# Patient Record
Sex: MALE | Race: WHITE | NOT HISPANIC OR LATINO | Employment: OTHER | URBAN - METROPOLITAN AREA
[De-identification: names, ages, dates, MRNs, and addresses within clinical notes are randomized per-mention and may not be internally consistent; named-entity substitution may affect disease eponyms.]

---

## 2017-05-23 PROBLEM — G89.29 CHRONIC PAIN OF LEFT KNEE: Status: ACTIVE | Noted: 2017-05-23

## 2017-05-23 PROBLEM — M25.562 CHRONIC PAIN OF LEFT KNEE: Status: ACTIVE | Noted: 2017-05-23

## 2017-05-26 ENCOUNTER — PATIENT OUTREACH (OUTPATIENT)
Dept: ADMINISTRATIVE | Facility: CLINIC | Age: 62
End: 2017-05-26

## 2017-05-26 ENCOUNTER — NURSE TRIAGE (OUTPATIENT)
Dept: ADMINISTRATIVE | Facility: CLINIC | Age: 62
End: 2017-05-26

## 2017-05-26 NOTE — PROGRESS NOTES
C3 nurse attempted to contact patient. No answer. The following message was left for the patient to return the call:  Good morning  I am a nurse calling on behalf of [facility] from the [facility].  This is a Transitional Care Call for Andrea King. When you have a moment please contact us at [phone] or [phone] Monday through Friday, between the hours of 8 am to 4 pm. We look forward to speaking with you. On behalf of [facility] have a nice day.    The patient has a scheduled [facility]OSFU appointment with [facility] on 06/12/17 @ 0815 hrs.

## 2017-05-26 NOTE — TELEPHONE ENCOUNTER
Reason for Disposition   [1] SEVERE back pain (e.g., excruciating) AND [2] sudden onset AND [3] age > 60    Protocols used: ST BACK PAIN-A-PATTI Mendez's wife is reporting Andrea has 100.5 temperature and has severe back pain not relieved by pain medication. He is post op day 3 from left knee arthroplasty. Wife states he has no issues with pain at surgery site or s/s of infection there. She states he does have a history of chronic back pain. His urine is darker than usual. His wife states he is drinking fluids and urinating regularly. Advised to bring him to [facility] ED for evaluation. She agrees to plan. Please contact caller with any further care advice.

## 2017-05-26 NOTE — PROGRESS NOTES
Patient's wife reports unable to do PT in [facility], called outpatient PT @ [facility] spoke with Kaitlin, she informed me that [facility] will contact patient by middle of next week with schedule. Notified Mrs King reported this, verbalizes understanding.

## 2017-05-26 NOTE — PATIENT INSTRUCTIONS
Discharge Instructions for Total Knee Replacement  You have undergone knee replacement surgery. The knee joint forms where the thighbone, shinbone, and kneecap meet. The knee joint is supported by muscles and ligaments, and is lined with a cushioning called cartilage. Over time, cartilage wears away. This can make the knee feel stiff and painful. Your doctor replaced your painful joint with an artificial joint to relieve pain and restore movement. Here are some instructions to follow once at home.  Home care  · When your doctor says it's OK to shower, carefully wash your incision with soap and water. Rinse the incision well. Then gently pat it dry. Dont rub the incision, or apply creams or lotions. Sit on a shower stool or chair when you shower to keep from falling.  · Take pain medicine as directed by your doctor.  Sitting and sleeping  · Sit in chairs with arms. The arms make it easier for you to stand up or sit down.  · Dont sit for more than 30 to 45 minutes at one time.  · Nap if you are tired, but dont stay in bed all day.  · Sleep with a pillow under your ankle, not your knee. Be sure to change the position of your leg during the night.  Moving safely  · The key to successful recovery is movement with walking and exercising your knee as directed by your doctor. You should be able to start moving your leg shortly after surgery as directed by your doctor.    · Walk up and down stairs with support. Try one step at a time. Use the railing if possible.  · Dont drive until your doctor says its OK. Most people can start driving about 6 weeks after surgery. Dont drive while you are taking opioid pain medication.  Other precautions  · Avoid soaking your knee in water (no hot tubs, bathtubs, swimming pools) until your doctor says its OK.  · Wear the support stockings you were given in the hospital, as instructed by your doctor. You may wear these stockings for 4 to 6 weeks after surgery. If needed, you can place  a bandage over the incision to prevent irritation from clothing or support stockings.  · Arrange your household to keep the items you need handy. Keep everything else out of the way. Remove items that may cause you to fall, such as throw rugs and electrical cords.  · Use nonslip bath mats, grab bars, an elevated toilet seat, and a shower chair in your bathroom.  · Until your balance, flexibility, and strength improve, use a cane, crutches, a walker, handrails, or someone to help you.  · Keep your hands free by using a backpack, miroslava pack, apron, or pockets to carry things.  · Prevent infection. Ask your doctor for instructions if you havent already received them. Any infection will need to be treated immediately. Call your doctor right away if you think you might have an infection.  · Tell your dentist that you have an artificial joint and take antibiotics as prescribed before any dental work.  · Tell all your healthcare providers about your artificial joint before any medical procedure.  · Maintain a healthy weight. Get help to lose any extra pounds. Added body weight puts stress on the knee.  · Take any medicine you may have been given after surgery. This may include blood-thinning medicine to prevent blood clots or antibiotics to prevent infection.  Follow-up care  Follow up with your healthcare provider, or as advised. You will need to have your staples removed 2 to 3 weeks after surgery.     When to call your healthcare provider  Call 911 right away if you have:  · Chest pain  · Shortness of breath  · Any pain or tenderness in your calf  Otherwise, call your healthcare provider right away if you have:  · Fever of 100.4 °F (38 °C) or higher, or as advised  · Shaking chills  · Stiffness, or inability to move the knee  · Increased swelling in your leg  · Increased redness, tenderness, or swelling in or around the knee incision  · Drainage from the knee incision  · Increased knee pain   Date Last Reviewed:  7/1/2016  © 0661-0225 [employer]. [address] [place] [address], [place] [address]. All rights reserved. This information is not intended as a substitute for professional medical care. Always follow your healthcare professional's instructions.

## 2017-05-29 ENCOUNTER — NURSE TRIAGE (OUTPATIENT)
Dept: ADMINISTRATIVE | Facility: CLINIC | Age: 62
End: 2017-05-29

## 2017-05-29 NOTE — TELEPHONE ENCOUNTER
Spouse called re L TKR 5/23. C/o pain in left calf - good color, good color, feet warm. No SOB, left calf more swollen than right. Having trouble putting foot all the way down. Pt has had knee surg. rec ED due to sx of DVT.Call back with questions.  rec 911 if SOB, sx worsen.     Reason for Disposition   [1] Can't walk or can barely walk AND [2] new onset    Protocols used: ST LEG SWELLING AND EDEMA-A-AH

## 2017-07-07 PROBLEM — G89.29 CHRONIC BILATERAL LOW BACK PAIN WITH BILATERAL SCIATICA: Status: ACTIVE | Noted: 2017-07-07

## 2017-07-07 PROBLEM — M54.16 LUMBAR RADICULAR PAIN: Status: ACTIVE | Noted: 2017-07-07

## 2017-07-07 PROBLEM — M54.42 CHRONIC BILATERAL LOW BACK PAIN WITH BILATERAL SCIATICA: Status: ACTIVE | Noted: 2017-07-07

## 2017-07-07 PROBLEM — M54.41 CHRONIC BILATERAL LOW BACK PAIN WITH BILATERAL SCIATICA: Status: ACTIVE | Noted: 2017-07-07

## 2017-10-25 PROBLEM — M25.512 ACUTE PAIN OF LEFT SHOULDER: Status: ACTIVE | Noted: 2017-10-25

## 2017-11-10 PROBLEM — K74.60 CIRRHOSIS: Status: ACTIVE | Noted: 2017-11-10

## 2017-11-27 ENCOUNTER — LAB VISIT (OUTPATIENT)
Dept: LAB | Facility: HOSPITAL | Age: 62
End: 2017-11-27
Payer: MEDICAID

## 2017-11-27 DIAGNOSIS — Z96.652 STATUS POST LEFT KNEE REPLACEMENT: ICD-10-CM

## 2017-11-27 PROCEDURE — 86353 LYMPHOCYTE TRANSFORMATION: CPT

## 2017-11-27 PROCEDURE — 36415 COLL VENOUS BLD VENIPUNCTURE: CPT

## 2017-12-07 LAB — METAL SENSITIVITY TESTING, METALS ONLY: NORMAL

## 2019-01-16 NOTE — H&P (VIEW-ONLY)
HPI:  Andrea King is a 64 y.o. male sp left TKA.   Main issue in pain in lateral knee - severe and worsening.  No deep knee pain.  No fevers or chills or drainage.  No prior history of infection . IT band injection helped some. Voltaren gel has worked well in the past.    05/23/2017  PROCEDURE:  Left total knee replacement. (CPT# 52818)   SURGEON: Magdi Solis M.D.   ASSISTANT:   Rowdy Cabello MD and Rudi Galaviz.      Interval: continued lateral pain, has not changed since last visit.  Injections and stretches have not helped.      PE:  Incision is well healed with no signs of infection.  Tender along lateral capsule and fibular head AROM 5-130        xrays today show TKA in excellent position.  No fractures or loosening.    Sed Rate   Date Value Ref Range Status   10/25/2017 2 0 - 10 mm/Hr Final     CRP   Date Value Ref Range Status   10/25/2017 <0.5 <0.9 mg/L Final         A/P:  Andrea King is a 64 y.o. male sp above procedure.  Continued pain for 2 years.  Lateral.  Will send to [facility] for geniculate block/ablation.  RTC 3 months

## 2019-02-06 ENCOUNTER — HOSPITAL ENCOUNTER (OUTPATIENT)
Facility: OTHER | Age: 64
Discharge: HOME | End: 2019-02-06
Attending: ANESTHESIOLOGY | Admitting: ANESTHESIOLOGY
Payer: MEDICARE

## 2019-02-06 VITALS
HEART RATE: 71 BPM | OXYGEN SATURATION: 97 % | RESPIRATION RATE: 18 BRPM | BODY MASS INDEX: 24.78 KG/M2 | WEIGHT: 187 LBS | HEIGHT: 73 IN | SYSTOLIC BLOOD PRESSURE: 126 MMHG | TEMPERATURE: 98 F | DIASTOLIC BLOOD PRESSURE: 62 MMHG

## 2019-02-06 DIAGNOSIS — G89.29 CHRONIC KNEE PAIN: ICD-10-CM

## 2019-02-06 DIAGNOSIS — G89.29 CHRONIC KNEE PAIN, UNSPECIFIED LATERALITY: Primary | ICD-10-CM

## 2019-02-06 DIAGNOSIS — M25.569 CHRONIC KNEE PAIN, UNSPECIFIED LATERALITY: Primary | ICD-10-CM

## 2019-02-06 DIAGNOSIS — M17.9 OSTEOARTHRITIS OF KNEE, UNSPECIFIED LATERALITY, UNSPECIFIED OSTEOARTHRITIS TYPE: ICD-10-CM

## 2019-02-06 DIAGNOSIS — M25.569 CHRONIC KNEE PAIN: ICD-10-CM

## 2019-02-06 LAB — POCT GLUCOSE: 80 MG/DL (ref 70–110)

## 2019-02-06 PROCEDURE — 82947 ASSAY GLUCOSE BLOOD QUANT: CPT | Performed by: ANESTHESIOLOGY

## 2019-02-06 PROCEDURE — 64450 NJX AA&/STRD OTHER PN/BRANCH: CPT | Mod: LT,,, | Performed by: ANESTHESIOLOGY

## 2019-02-06 PROCEDURE — 63600175 PHARM REV CODE 636 W HCPCS: Performed by: ANESTHESIOLOGY

## 2019-02-06 PROCEDURE — 77002 NEEDLE LOCALIZATION BY XRAY: CPT | Performed by: ANESTHESIOLOGY

## 2019-02-06 PROCEDURE — 25000003 PHARM REV CODE 250: Performed by: PHYSICAL MEDICINE & REHABILITATION

## 2019-02-06 PROCEDURE — 64450 PR NERVE BLOCK INJ, ANES/STEROID, OTHER PERIPHERAL: ICD-10-PCS | Mod: LT,,, | Performed by: ANESTHESIOLOGY

## 2019-02-06 PROCEDURE — 25000003 PHARM REV CODE 250: Performed by: ANESTHESIOLOGY

## 2019-02-06 PROCEDURE — 64450 NJX AA&/STRD OTHER PN/BRANCH: CPT | Performed by: ANESTHESIOLOGY

## 2019-02-06 RX ORDER — BUPIVACAINE HYDROCHLORIDE 2.5 MG/ML
INJECTION, SOLUTION EPIDURAL; INFILTRATION; INTRACAUDAL
Status: DISCONTINUED | OUTPATIENT
Start: 2019-02-06 | End: 2019-02-06 | Stop reason: HOSPADM

## 2019-02-06 RX ORDER — SODIUM CHLORIDE 9 MG/ML
500 INJECTION, SOLUTION INTRAVENOUS CONTINUOUS
Status: DISCONTINUED | OUTPATIENT
Start: 2019-02-06 | End: 2019-02-06 | Stop reason: HOSPADM

## 2019-02-06 RX ORDER — LIDOCAINE HYDROCHLORIDE 10 MG/ML
INJECTION INFILTRATION; PERINEURAL
Status: DISCONTINUED | OUTPATIENT
Start: 2019-02-06 | End: 2019-02-06 | Stop reason: HOSPADM

## 2019-02-06 RX ORDER — TRIAMCINOLONE ACETONIDE 40 MG/ML
INJECTION, SUSPENSION INTRA-ARTICULAR; INTRAMUSCULAR
Status: DISCONTINUED | OUTPATIENT
Start: 2019-02-06 | End: 2019-02-06 | Stop reason: HOSPADM

## 2019-02-06 RX ORDER — MIDAZOLAM HYDROCHLORIDE 1 MG/ML
INJECTION, SOLUTION INTRAMUSCULAR; INTRAVENOUS
Status: DISCONTINUED | OUTPATIENT
Start: 2019-02-06 | End: 2019-02-06 | Stop reason: HOSPADM

## 2019-02-06 RX ADMIN — SODIUM CHLORIDE 500 ML: 0.9 INJECTION, SOLUTION INTRAVENOUS at 10:02

## 2019-02-06 NOTE — OP NOTE
Patient Name: Andrea King  MRN: [id number]    INFORMED CONSENT: The procedure, risks, benefits and options were discussed with patient. There are no contraindications to the procedure. The patient expressed understanding and agreed to proceed. The personnel performing the procedure was discussed. I verify that I personally obtained Andrea's consent prior to the start of the procedure and the signed consent can be found on the patient's chart.    Procedure Date: 02/06/2019    Anesthesia: Topical    Pre Procedure diagnosis: Chronic pain of left knee [M25.562, G89.29]  1. Chronic knee pain, unspecified laterality    2. Osteoarthritis of knee, unspecified laterality, unspecified osteoarthritis type    3. Chronic knee pain      Post-Procedure diagnosis: SAME      Moderate Sedation: No      PROCEDURE: LEFT GENICULAR NERVE BLOCK    DESCRIPTION OF PROCEDURE: The patient was brought to the fluoroscopy suite. IV access was obtained prior to the procedure. The patient was positioned supine on the fluoroscopy table. Continuous hemodynamic monitoring was initiated including blood pressure, EKG, and pulse oximetry. The area of the around the left knee joint was prepped with chlorhexidine  and draped into a sterile field. Skin anesthesia was achieved using Lidocaine 1% over the respective injection sites. A 22 gauge 3.5 inch spinal needle was slowly inserted and advanced to the junction of the lateral femoral and the epicondyle while contacting periosteum to block the superior lateral genicular nerve using the AP and lateral fluoroscopic imaging. Same process was repeated at the junction of the medial femoral shaft and the epicondyle to block the superior medial genicular nerve. The same process was repeated again using a 3rd needle which was advanced at the junction of the medial tibial plateau and the epicondyle to block the inferior medial genicular nerve. Using AP fluoroscopy the position of all 3 needles was confirmed.  Afterwards lateral fluoroscopic images were obtained and the needles were adjusted to lay in the middle of the diaphysis.Negative aspiration for blood. . A combination of 3 cc of Bupivacaine 0.25% and 40 mg of kenalog was injected at all needle location to block all targeted nerves.. There was no pain on injection. The needle was removed and bleeding was nil. A sterile dressing was applied. No specimens collected. PATIENT was taken to the Post-block Recovery Area for further observation.       Blood Loss: Nill  Specimen: None        Rowdy Ivy MD

## 2019-02-06 NOTE — DISCHARGE INSTRUCTIONS
Adult Procedural Sedation Instructions    Recovery After Procedural Sedation (Adult)  You have been given medicine by vein to make you sleep during your surgery. This may have included both a pain medicine and sleeping medicine. Most of the effects have worn off. But you may still have some drowsiness for the next 6 to 8 hours.  Home care  Follow these guidelines when you get home:  · For the next 8 hours, you should be watched by a responsible adult. This person should make sure your condition is not getting worse.  · Don't drink any alcohol for the next 24 hours.  · Don't drive, operate dangerous machinery, or make important business or personal decisions during the next 24 hours.  Note: Your healthcare provider may tell you not to take any medicine by mouth for pain or sleep in the next 4 hours. These medicines may react with the medicines you were given in the hospital. This could cause a much stronger response than usual.  Follow-up care  Follow up with your healthcare provider if you are not alert and back to your usual level of activity within 12 hours.  When to seek medical advice  Call your healthcare provider right away if any of these occur:  · Drowsiness gets worse  · Weakness or dizziness gets worse  · Repeated vomiting  · You can't be awakened   Date Last Reviewed: 10/18/2016  © 4971-6745 [employer]. [address] [place] [address], [place] [address]. All rights reserved. This information is not intended as a substitute for professional medical care. Always follow your healthcare professional's instructions.       Thank you for allowing us to care for you today. You may receive a survey about the care we provided. Your feedback is valuable and helps us provide excellent care throughout the community.     Home Care Instructions for Pain Management:    1. DIET:   You may resume your normal diet today.   2. BATHING:   You may shower with luke warm water. No tub baths or anything that will soak  injection sites under water for the next 24 hours.  3. DRESSING:   You may remove your bandage today.   4. ACTIVITY LEVEL:   You may resume your normal activities 24 hrs after your procedure. Nothing strenuous today.  5. MEDICATIONS:   You may resume your normal medications today. To restart blood thinners, ask your doctor.  6. DRIVING    If you have received any sedatives by mouth today, you may not drive for 12 hours.    If you have received any sedation through your IV, you may not drive for 24 hrs.   7. SPECIAL INSTRUCTIONS:   No heat to the injection site for 24 hrs including, hot bath or shower, heating pad, moist heat, or hot tubs.    Use ice pack to injection site for any pain or discomfort.  Apply ice packs for 20 minute intervals as needed.    IF you have diabetes, be sure to monitor your blood sugar more closely. IF your injection contained steroids your blood sugar levels may become higher than normal.    If you are still having pain upon discharge:  Your pain may improve over the next 48 hours. The anesthetic (numbing medication) works immediately to 48 hours. IF your injection contained a steroid (anti-inflammatory medication), it takes approximately 3 days to start feeling relief and 7-10 days to see your greatest results from the medication. It is possible you may need subsequent injections. This would be discussed at your follow up appointment with pain management or your referring doctor.      PLEASE CALL YOUR DOCTOR IF:  1. Redness or swelling around the injection site.  2. Fever of 101 degrees or more  3. Drainage (pus) from the injection site.  4. For any continuous bleeding (some dried blood over the incision is normal.)    FOR EMERGENCIES:   If any unusual problems or difficulties occur during clinic hours, call [phone] or [phone].

## 2019-02-06 NOTE — DISCHARGE SUMMARY
Discharge Note  Short Stay      SUMMARY     Admit Date: 2/6/2019    Attending Physician: Rowdy Ivy      Discharge Physician: Rowdy Ivy      Discharge Date: 2/6/2019 10:51 AM    Procedure(s) (LRB):  BLOCK, NERVE, GENICULAR (Left)    Final Diagnosis: Chronic pain of left knee [M25.562, G89.29]    Disposition: Home or self care    Patient Instructions:   Current Discharge Medication List      CONTINUE these medications which have NOT CHANGED    Details   B-complex with vitamin C (Z-BEC OR EQUIV) tablet Take 1 tablet by mouth once daily.      blood sugar diagnostic Strp 1 strip by Misc.(Non-Drug; Combo Route) route once daily.  Qty: 50 each, Refills: 11      capsaicin 0.1 % Crea Apply 3 mLs topically 3 (three) times daily as needed.  Qty: 56.6 g, Refills: 0      citalopram (CELEXA) 20 MG tablet TAKE ONE TABLET BY MOUTH ONCE DAILY  Qty: 30 tablet, Refills: 11    Comments: Please consider 90 day supplies to promote better adherence      clonazepam (KLONOPIN) 1 MG tablet Take 1 mg by mouth 2 (two) times daily as needed.        diclofenac sodium (VOLTAREN) 1 % Gel Apply 2 g topically once daily.  Qty: 1 Tube, Refills: 2      docusate sodium (COLACE) 100 MG capsule Take 1 capsule (100 mg total) by mouth 2 (two) times daily as needed.  Qty: 60 capsule, Refills: 0      gabapentin (NEURONTIN) 800 MG tablet Take 1 tablet (800 mg total) by mouth 3 (three) times daily.  Qty: 90 tablet, Refills: 11      lancets Misc 1 each by Misc.(Non-Drug; Combo Route) route once daily.  Qty: 50 each, Refills: 11      lisinopril (PRINIVIL,ZESTRIL) 20 MG tablet Take 1 tablet (20 mg total) by mouth once daily.  Qty: 30 tablet, Refills: 11    Comments: Please consider 90 day supplies to promote better adherence      metFORMIN (GLUCOPHAGE) 1000 MG tablet Take 1 tablet (1,000 mg total) by mouth 2 (two) times daily.  Qty: 60 tablet, Refills: 11    Comments: Please consider 90 day supplies to promote better adherence      milk thistle 175  mg tablet Take 1,000 mg by mouth once daily.      ondansetron (ZOFRAN) 4 MG tablet Take 2 tablets (8 mg total) by mouth every 8 (eight) hours as needed.  Qty: 20 tablet, Refills: 0      oxyCODONE-acetaminophen (PERCOCET) 5-325 mg per tablet Take 1 tablet by mouth every 8 (eight) hours as needed for Pain.  Qty: 60 tablet, Refills: 0      traZODone (DESYREL) 100 MG tablet TAKE ONE TABLET BY MOUTH ONCE DAILY IN THE EVENING  Qty: 30 tablet, Refills: 11    Comments: Please consider 90 day supplies to promote better adherence                 Discharge Diagnosis: Chronic pain of left knee [M25.562, G89.29]  Condition on Discharge: Stable with no complications to procedure   Diet on Discharge: Same as before.  Activity: as per instruction sheet.  Discharge to: Home with a responsible adult.  Follow up: 2-4 weeks

## 2019-02-06 NOTE — INTERVAL H&P NOTE
The patient has been examined and the H&P has been reviewed:    I concur with the findings and no changes have occurred since H&P was written. Patient continues with the same type and quality of pain involving the left knee. Denies any use of blood thinning agents or concern for infection.     Anesthesia/Surgery risks, benefits and alternative options discussed and understood by patient/family.          Active Hospital Problems    Diagnosis  POA    Chronic knee pain [M25.569, G89.29]  Yes      Resolved Hospital Problems   No resolved problems to display.     Plan:  Proceed with left sided genicular nerve blocks today.    Discussed and evaluated patient with staff. Thank you for allowing me to participate in their care.     Rowdy Ivy  Pain Medicine Fellow  [facility]  PGYV

## 2019-02-07 ENCOUNTER — TELEPHONE (OUTPATIENT)
Dept: PAIN MEDICINE | Facility: CLINIC | Age: 64
End: 2019-02-07

## 2019-02-07 NOTE — TELEPHONE ENCOUNTER
----- Message from Tash Harmon sent at 2/7/2019 12:41 PM CST -----  Contact: Pt's wife  Name of Who is Calling: Sunshine      What is the request in detail: Pain Diary, after procedure 11:15 am Pain 4, 12noon 3, 1pm 2, 2pm 2, 3pm 2, 4pm 2, 5pm, 1. 10pm 1, and today at 11am pain at 2. Overall percentage 90-95%. Please call and advise      Can the clinic reply by [facility]: no      What Number to Call Back if not in [facility]: [phone]

## 2019-02-07 NOTE — TELEPHONE ENCOUNTER
Contacted and spoke with pt's wife regarding pain diary.Valeriemaryan reports % of overall pain relief from injection on 2/6/19

## 2019-02-18 ENCOUNTER — TELEPHONE (OUTPATIENT)
Dept: PAIN MEDICINE | Facility: CLINIC | Age: 64
End: 2019-02-18

## 2019-02-18 NOTE — TELEPHONE ENCOUNTER
Patient was a direct referral for procedure. Pain diary results presented to that provider. No response captured at this time, please check with their office.

## 2019-02-18 NOTE — TELEPHONE ENCOUNTER
----- Message from Yumiko Birch sent at 2/18/2019  8:19 AM CST -----  Will it be ok to schedule pt. For Left Knee Rfa with Dr. Sanders, please advise.

## 2019-06-06 ENCOUNTER — PATIENT OUTREACH (OUTPATIENT)
Dept: ADMINISTRATIVE | Facility: HOSPITAL | Age: 64
End: 2019-06-06

## 2019-06-13 ENCOUNTER — TELEPHONE (OUTPATIENT)
Dept: PAIN MEDICINE | Facility: CLINIC | Age: 64
End: 2019-06-13

## 2019-06-13 NOTE — TELEPHONE ENCOUNTER
----- Message from Summer Fritz sent at 6/13/2019 10:09 AM CDT -----  Contact: pt  Name of Who is Calling: CLEO MONTALVO [[id number]]    What is the request in detail: pt calling in regards to being in a lot of pain.. Pt would like to get nerves burned.. Please advise    Can the clinic reply by [facility]: no      What Number to [facility] Back [facility] not [facility]: [phone]

## 2019-06-13 NOTE — TELEPHONE ENCOUNTER
Contacted and spoke with pt's wife regarding RFA. Pt was offered and scheduled for consultation with Dr. Taylor to discuss RFA.    Pt's wife verbalized understanding

## 2019-06-20 ENCOUNTER — TELEPHONE (OUTPATIENT)
Dept: PAIN MEDICINE | Facility: CLINIC | Age: 64
End: 2019-06-20

## 2019-06-20 NOTE — TELEPHONE ENCOUNTER
Called to confirm appointment with Dr. Taylor on 6/27/19 @ 1:30pm  in pain management clinic.          Pt wife confirmed. Informed pt of IMP. Verified pt insurance. Informed pt of location and suite number.  Patient gave verbal understanding

## 2019-06-24 PROBLEM — M43.00 SPONDYLOLYSIS: Status: ACTIVE | Noted: 2019-06-24

## 2019-06-24 PROBLEM — M48.02 SPINAL STENOSIS OF CERVICAL REGION: Status: ACTIVE | Noted: 2019-06-24

## 2019-06-27 ENCOUNTER — OFFICE VISIT (OUTPATIENT)
Dept: PAIN MEDICINE | Facility: CLINIC | Age: 64
End: 2019-06-27
Attending: ANESTHESIOLOGY
Payer: MEDICARE

## 2019-06-27 VITALS
SYSTOLIC BLOOD PRESSURE: 126 MMHG | DIASTOLIC BLOOD PRESSURE: 80 MMHG | HEIGHT: 73 IN | RESPIRATION RATE: 18 BRPM | BODY MASS INDEX: 23.86 KG/M2 | HEART RATE: 61 BPM | WEIGHT: 180 LBS | TEMPERATURE: 99 F

## 2019-06-27 DIAGNOSIS — M17.12 PRIMARY OSTEOARTHRITIS OF LEFT KNEE: Primary | Chronic | ICD-10-CM

## 2019-06-27 DIAGNOSIS — G89.29 CHRONIC PAIN OF LEFT KNEE: ICD-10-CM

## 2019-06-27 DIAGNOSIS — M25.562 CHRONIC PAIN OF LEFT KNEE: ICD-10-CM

## 2019-06-27 DIAGNOSIS — M47.819 SPONDYLOSIS WITHOUT MYELOPATHY: ICD-10-CM

## 2019-06-27 DIAGNOSIS — M47.9 OSTEOARTHRITIS OF SPINE, UNSPECIFIED SPINAL OSTEOARTHRITIS COMPLICATION STATUS, UNSPECIFIED SPINAL REGION: ICD-10-CM

## 2019-06-27 PROCEDURE — 3074F SYST BP LT 130 MM HG: CPT | Mod: CPTII,S$GLB,, | Performed by: ANESTHESIOLOGY

## 2019-06-27 PROCEDURE — 99214 PR OFFICE/OUTPT VISIT, EST, LEVL IV, 30-39 MIN: ICD-10-PCS | Mod: S$GLB,,, | Performed by: ANESTHESIOLOGY

## 2019-06-27 PROCEDURE — 99214 OFFICE O/P EST MOD 30 MIN: CPT | Mod: S$GLB,,, | Performed by: ANESTHESIOLOGY

## 2019-06-27 PROCEDURE — 99999 PR PBB SHADOW E&M-EST. PATIENT-LVL III: CPT | Mod: PBBFAC,,, | Performed by: ANESTHESIOLOGY

## 2019-06-27 PROCEDURE — 1101F PR PT FALLS ASSESS DOC 0-1 FALLS W/OUT INJ PAST YR: ICD-10-PCS | Mod: CPTII,S$GLB,, | Performed by: ANESTHESIOLOGY

## 2019-06-27 PROCEDURE — 99999 PR PBB SHADOW E&M-EST. PATIENT-LVL III: ICD-10-PCS | Mod: PBBFAC,,, | Performed by: ANESTHESIOLOGY

## 2019-06-27 PROCEDURE — 1101F PT FALLS ASSESS-DOCD LE1/YR: CPT | Mod: CPTII,S$GLB,, | Performed by: ANESTHESIOLOGY

## 2019-06-27 PROCEDURE — 3074F PR MOST RECENT SYSTOLIC BLOOD PRESSURE < 130 MM HG: ICD-10-PCS | Mod: CPTII,S$GLB,, | Performed by: ANESTHESIOLOGY

## 2019-06-27 PROCEDURE — 3079F DIAST BP 80-89 MM HG: CPT | Mod: CPTII,S$GLB,, | Performed by: ANESTHESIOLOGY

## 2019-06-27 PROCEDURE — 3079F PR MOST RECENT DIASTOLIC BLOOD PRESSURE 80-89 MM HG: ICD-10-PCS | Mod: CPTII,S$GLB,, | Performed by: ANESTHESIOLOGY

## 2019-06-27 RX ORDER — LANOLIN ALCOHOL/MO/W.PET/CERES
100 CREAM (GRAM) TOPICAL DAILY
COMMUNITY

## 2019-06-27 RX ORDER — AMOXICILLIN 500 MG
CAPSULE ORAL DAILY
COMMUNITY

## 2019-06-27 RX ORDER — LANOLIN ALCOHOL/MO/W.PET/CERES
1 CREAM (GRAM) TOPICAL 2 TIMES DAILY
COMMUNITY

## 2019-06-27 NOTE — PROGRESS NOTES
Chronic Pain - New Consult    Referring Physician: Jay Jay Valdez MD    Chief Complaint:   Chief Complaint   Patient presents with    Knee Pain     left         SUBJECTIVE:    Andrea King presents to the clinic for the evaluation of left knee pain. The pain started 1-2 years ago following knee replacement and symptoms have been worsening.The pain is located in the left knee area and radiates to the calf.  The pain is described as constant and is rated as 10/10. The pain is rated with a score of  8/10 on the BEST day and a score of 10/10 on the WORST day.  Symptoms interfere with daily activity, sleeping and work. The pain is exacerbated by Bending, Night Time, Extension and Lifting.  The pain is mitigated by nothing. He reports spending 30 minutes per day reclining. The patient reports 3-4 hours of uninterrupted sleep per night.  Patient was referred to us for a left knee genicular nerve block for which he stated had 100% relief for the 1st 3 days and overall 80% relief for 1 week  He is here today for an evaluation regarding proceeding with his genicular nerve radiofrequency ablation    Patient denies night fever/night sweats, urinary incontinence, bowel incontinence, significant weight loss, significant motor weakness and loss of sensations.    Physical Therapy/Home Exercise: yes, home exercise     Pain Disability Index Review:  Last 3 PDI Scores 2019   Pain Disability Index (PDI) 35       Pain Medications:    - Opioids: Ultram ER ( Tramadol HCL)  - Adjuvant Medications: none  - Anti-Coagulants: none  - Others: see med list     report:  Reviewed and consistent with medication use as prescribed.    Pain Procedures:     19-LEFT GENICULAR NERVE BLOCK    Imagin/15/19   Narrative     EXAMINATION:  XR KNEE ORTHO LEFT    CLINICAL HISTORY:  Pain in left knee    COMPARISON:  Left knee including AP view both knees 2018.    FINDINGS:  Left knee--total left knee arthroplasty again noted with  no change in positioning of prosthesis or alignment.  No superimposed acute bony abnormality is seen.    Single AP view right knee--moderate medial compartment joint space narrowing with mild subchondral sclerosis and medial marginal spurring unchanged.      Impression       Stable appearance total left knee arthroplasty.    Degenerative change right knee as previously seen.      Electronically signed by: Sagrario Oneill MD  Date: 01/16/2019  Time: 09:08         Past Medical History:   Diagnosis Date    Arthritis     Back pain     Bipolar disorder     Cirrhosis     Cirrhosis of liver 1/9/2015    Diabetes     FH: total knee replacement 05/2017    Hypertension     Neck pain     Sciatica     Sinus congestion      Past Surgical History:   Procedure Laterality Date    APPENDECTOMY      ARTHROPLASTY-KNEE Left 5/23/2017    [facility] at [facility] OR    BIOPSY N/A 9/25/2014    Performed by Gregory Powers MD at [facility] CATH LAB    BLOCK, NERVE, GENICULAR Left 2/6/2019    [facility] PAIN MGT    COLONOSCOPY  2014    marginal prep, repeat 2019    COLONOSCOPY  2011    TA polyps    COLONOSCOPY N/A 11/13/2014    Performed by Ginna Barnes MD at [facility] ENDO    ESOPHAGOGASTRODUODENOSCOPY (EGD) N/A 11/10/2017    Performed by Madi Bernard MD at [facility] ENDO    FINGER TENDON REPAIR Left     trigger finger    HERNIA REPAIR      KNEE SURGERY Left     LIVER BIOPSY  2014    steatosis cirrhosis    TONSILLECTOMY, ADENOIDECTOMY      TOTAL KNEE ARTHROPLASTY  05/2017    UPPER GASTROINTESTINAL ENDOSCOPY  11/2017    normal-rpt 2yrs     Social History     Socioeconomic History    Marital status:      Spouse name: Not on file    Number of children: Not on file    Years of education: 8    Highest education level: Not on file   Occupational History    Not on file   Social Needs    Financial resource strain: Not on file    Food insecurity:     Worry: Not on  file     Inability: Not on file    Transportation needs:     Medical: Not on file     Non-medical: Not on file   Tobacco Use    Smoking status: Former Smoker     Packs/day: 2.00     Years: 3.00     Pack years: 6.00     Types: Cigarettes    Smokeless tobacco: Former User   Substance and Sexual Activity    Alcohol use: No     Alcohol/week: 0.0 oz    Drug use: No    Sexual activity: Yes   Lifestyle    Physical activity:     Days per week: Not on file     Minutes per session: Not on file    Stress: Not on file   Relationships    Social connections:     Talks on phone: Not on file     Gets together: Not on file     Attends [employer] service: Not on file     Active member of club or organization: Not on file     Attends meetings of clubs or organizations: Not on file     Relationship status: Not on file   Other Topics Concern    Not on file   Social History Narrative    Not on file     Family History   Problem Relation Age of Onset    Atrial fibrillation Mother     Diabetes Mother     Coronary artery disease Father     Leukemia Sister     Aneurysm Brother     No Known Problems Maternal Aunt     No Known Problems Maternal Uncle     No Known Problems Paternal Aunt     No Known Problems Paternal Uncle     No Known Problems Maternal Grandmother     No Known Problems Maternal Grandfather     No Known Problems Paternal Grandmother     No Known Problems Paternal Grandfather     Celiac disease Neg Hx     Cirrhosis Neg Hx     Colon cancer Neg Hx     Colon polyps Neg Hx     Crohn's disease Neg Hx     Cystic fibrosis Neg Hx     Esophageal cancer Neg Hx     Hemochromatosis Neg Hx     Inflammatory bowel disease Neg Hx     Irritable bowel syndrome Neg Hx     Liver cancer Neg Hx     Liver disease Neg Hx     Rectal cancer Neg Hx     Stomach cancer Neg Hx     Ulcerative colitis Neg Hx     Bakari's disease Neg Hx        Review of patient's allergies indicates:   Allergen Reactions    Vicodin  [hydrocodone-acetaminophen] Anxiety     Make him angry       Current Outpatient Medications   Medication Sig    B-complex with vitamin C (Z-BEC OR EQUIV) tablet Take 1 tablet by mouth once daily.    blood sugar diagnostic Strp 1 strip by Misc.(Non-Drug; Combo Route) route once daily.    calcium citrate-vitamin D3 315-200 mg (CITRACAL+D) 315-200 mg-unit per tablet Take 1 tablet by mouth 2 (two) times daily.    citalopram (CELEXA) 20 MG tablet TAKE ONE TABLET BY MOUTH ONCE DAILY    cyanocobalamin (VITAMIN B-12) 1000 MCG tablet Take 100 mcg by mouth once daily.    fish oil-omega-3 fatty acids 300-1,000 mg capsule Take by mouth once daily.    lancets Misc 1 each by Misc.(Non-Drug; Combo Route) route once daily.    lisinopril (PRINIVIL,ZESTRIL) 20 MG tablet Take 1 tablet (20 mg total) by mouth once daily.    metFORMIN (GLUCOPHAGE) 1000 MG tablet Take 1 tablet (1,000 mg total) by mouth 2 (two) times daily.    milk thistle 175 mg tablet Take 1,000 mg by mouth once daily.    traMADol (ULTRAM) 50 mg tablet Take 1 tablet (50 mg total) by mouth every 6 (six) hours as needed (for breakthrough pain).    traZODone (DESYREL) 100 MG tablet TAKE ONE TABLET BY MOUTH ONCE DAILY IN THE EVENING    capsaicin 0.1 % Crea Apply 3 mLs topically 3 (three) times daily as needed.    clonazepam (KLONOPIN) 1 MG tablet Take 1 mg by mouth 2 (two) times daily as needed.      diclofenac sodium (VOLTAREN) 1 % Gel Apply 2 g topically once daily.    docusate sodium (COLACE) 100 MG capsule Take 1 capsule (100 mg total) by mouth 2 (two) times daily as needed.    gabapentin (NEURONTIN) 800 MG tablet Take 1 tablet (800 mg total) by mouth 3 (three) times daily.    ondansetron (ZOFRAN) 4 MG tablet Take 2 tablets (8 mg total) by mouth every 8 (eight) hours as needed.    oxyCODONE-acetaminophen (PERCOCET) 5-325 mg per tablet Take 1 tablet by mouth every 8 (eight) hours as needed for Pain.     No current facility-administered medications for  "this visit.        REVIEW OF SYSTEMS:    GENERAL:  No weight loss, malaise or fevers.  HEENT:  Negative for frequent or significant headaches.  NECK:  Negative for lumps, goiter, pain and significant neck swelling.  RESPIRATORY:  Negative for cough, wheezing or shortness of breath.  CARDIOVASCULAR:  Negative for chest pain, leg swelling or palpitations.  GI:  Negative for abdominal discomfort, blood in stools or black stools or change in bowel habits.  MUSCULOSKELETAL:  See HPI.  SKIN:  Negative for lesions, rash, and itching.  PSYCH:  +ve  for sleep disturbance, mood disorder and recent psychosocial stressors.  HEMATOLOGY/LYMPHOLOGY:  Negative for prolonged bleeding, bruising easily or swollen nodes.  NEURO:   No history of headaches, syncope, paralysis, seizures or tremors.  All other reviewed and negative other than HPI.    OBJECTIVE:    Resp 18   Ht 6' 1" (1.854 m)   Wt 81.6 kg (180 lb)   BMI 23.75 kg/m²     PHYSICAL EXAMINATION:    General appearance: Well appearing, in no acute distress, alert and oriented x3.  Psych:  Mood and affect appropriate.  Skin: Skin color, texture, turgor normal, no rashes or lesions, in both upper and lower body.  Head/face:  Normocephalic, atraumatic. No palpable lymph nodes.  Neck: No pain to palpation over the cervical paraspinous muscles. Spurling Negative. No pain with neck flexion, extension, or lateral flexion.   Cor: RRR  Pulm: CTA  GI:  Soft and non-tender.  Back: Straight leg raising in the sitting and supine positions is negative to radicular pain. Moderate pain to palpation over the spine or costovertebral angles. Normal range of motion without pain reproduction. Positive axial loading test bilateral.  Positive FABERE,Ganselin and Yeoman's test on the both side.negative FADIR  Extremities: positive for left knee limited ROM with tenderness on palpation and crepitus on movement, negative ant and post drawer sign   No deformities, edema, or skin discoloration. Good " capillary refill.  Musculoskeletal: Shoulder, hip, sacroiliac and knee provocative maneuvers are negative. Bilateral upper and lower extremity strength is normal and symmetric.  No atrophy or tone abnormalities are noted.  Neuro: Bilateral upper and lower extremity coordination and muscle stretch reflexes are physiologic and symmetric.  Plantar response are downgoing. No loss of sensation is noted.  Gait:  Antalgic    ASSESSMENT: 64 y.o. year old male with left knee and low back pain, consistent with      1. Primary osteoarthritis of left knee     2. Chronic pain of left knee     3. Spondylosis without myelopathy     4. Osteoarthritis of spine, unspecified spinal osteoarthritis complication status, unspecified spinal region           PLAN:     - I have stressed the importance of physical activity and a home exercise plan to help with pain and improve health.  - Patient can continue with medications for now since they are providing benefits, using them appropriately, and without side effects.  - Schedule for a Left knee genicular nerve radiofrequency ablation to help with his pain and progress with a home exercise plan.  - RTC 4 weeks  - Counseled patient regarding the importance of activity modification, constant sleeping habits and physical therapy.    The above plan and management options were discussed at length with patient. Patient is in agreement with the above and verbalized understanding. It will be communicated with the referring physician via electronic record, fax, or mail.    Robert Taylor MD      06/27/2019

## 2019-06-27 NOTE — H&P (VIEW-ONLY)
Chronic Pain - New Consult    Referring Physician: Jay Jay Valdez MD    Chief Complaint:   Chief Complaint   Patient presents with    Knee Pain     left         SUBJECTIVE:    Andrea King presents to the clinic for the evaluation of left knee pain. The pain started 1-2 years ago following knee replacement and symptoms have been worsening.The pain is located in the left knee area and radiates to the calf.  The pain is described as constant and is rated as 10/10. The pain is rated with a score of  8/10 on the BEST day and a score of 10/10 on the WORST day.  Symptoms interfere with daily activity, sleeping and work. The pain is exacerbated by Bending, Night Time, Extension and Lifting.  The pain is mitigated by nothing. He reports spending 30 minutes per day reclining. The patient reports 3-4 hours of uninterrupted sleep per night.  Patient was referred to us for a left knee genicular nerve block for which he stated had 100% relief for the 1st 3 days and overall 80% relief for 1 week  He is here today for an evaluation regarding proceeding with his genicular nerve radiofrequency ablation    Patient denies night fever/night sweats, urinary incontinence, bowel incontinence, significant weight loss, significant motor weakness and loss of sensations.    Physical Therapy/Home Exercise: yes, home exercise     Pain Disability Index Review:  Last 3 PDI Scores 2019   Pain Disability Index (PDI) 35       Pain Medications:    - Opioids: Ultram ER ( Tramadol HCL)  - Adjuvant Medications: none  - Anti-Coagulants: none  - Others: see med list     report:  Reviewed and consistent with medication use as prescribed.    Pain Procedures:     19-LEFT GENICULAR NERVE BLOCK    Imagin/15/19   Narrative     EXAMINATION:  XR KNEE ORTHO LEFT    CLINICAL HISTORY:  Pain in left knee    COMPARISON:  Left knee including AP view both knees 2018.    FINDINGS:  Left knee--total left knee arthroplasty again noted with  no change in positioning of prosthesis or alignment.  No superimposed acute bony abnormality is seen.    Single AP view right knee--moderate medial compartment joint space narrowing with mild subchondral sclerosis and medial marginal spurring unchanged.      Impression       Stable appearance total left knee arthroplasty.    Degenerative change right knee as previously seen.      Electronically signed by: Sagrario Oneill MD  Date: 01/16/2019  Time: 09:08         Past Medical History:   Diagnosis Date    Arthritis     Back pain     Bipolar disorder     Cirrhosis     Cirrhosis of liver 1/9/2015    Diabetes     FH: total knee replacement 05/2017    Hypertension     Neck pain     Sciatica     Sinus congestion      Past Surgical History:   Procedure Laterality Date    APPENDECTOMY      ARTHROPLASTY-KNEE Left 5/23/2017    [facility]    BIOPSY N/A 9/25/2014    Performed by Gregory Powers [facility] at [facility] CATH LAB    BLOCK, NERVE, GENICULAR Left 2/6/2019    [facility] PA[facility]    COLONOSCOPY  2014    marginal prep, repeat 2019    COLONOSCOPY  2011    TA polyps    COLONOSCOPY N/A 11/13/2014    [facility] at [facility] ENDO    ESOPHAGOGASTRODUODENOSCOPY (EGD) N/A 11/10/2017    Performed by Madi Bernard [facility] at [facility] ENDO    FINGER TENDON REPAIR Left     trigger finger    HERNIA REPAIR      KNEE SURGERY Left     LIVER BIOPSY  2014    steatosis cirrhosis    TONSILLECTOMY, ADENOIDECTOMY      TOTAL KNEE ARTHROPLASTY  05/2017    UPPER GASTROINTESTINAL ENDOSCOPY  11/2017    normal-rpt 2yrs     Social History     Socioeconomic History    Marital status:      Spouse name: Not on file    Number of children: Not on file    Years of education: 8    Highest education level: Not on file   Occupational History    Not on file   Social Needs    Financial resource strain: Not on file    Food insecurity:     Worry: Not on  file     Inability: Not on file    Transportation needs:     Medical: Not on file     Non-medical: Not on file   Tobacco Use    Smoking status: Former Smoker     Packs/day: 2.00     Years: 3.00     Pack years: 6.00     Types: Cigarettes    Smokeless tobacco: Former User   Substance and Sexual Activity    Alcohol use: No     Alcohol/week: 0.0 oz    Drug use: No    Sexual activity: Yes   Lifestyle    Physical activity:     Days per week: Not on file     Minutes per session: Not on file    Stress: Not on file   Relationships    Social connections:     Talks on phone: Not on file     Gets together: Not on file     Attends [employer] service: Not on file     Active member of club or organization: Not on file     Attends meetings of clubs or organizations: Not on file     Relationship status: Not on file   Other Topics Concern    Not on file   Social History Narrative    Not on file     Family History   Problem Relation Age of Onset    Atrial fibrillation Mother     Diabetes Mother     Coronary artery disease Father     Leukemia Sister     Aneurysm Brother     No Known Problems Maternal Aunt     No Known Problems Maternal Uncle     No Known Problems Paternal Aunt     No Known Problems Paternal Uncle     No Known Problems Maternal Grandmother     No Known Problems Maternal Grandfather     No Known Problems Paternal Grandmother     No Known Problems Paternal Grandfather     Celiac disease Neg Hx     Cirrhosis Neg Hx     Colon cancer Neg Hx     Colon polyps Neg Hx     Crohn's disease Neg Hx     Cystic fibrosis Neg Hx     Esophageal cancer Neg Hx     Hemochromatosis Neg Hx     Inflammatory bowel disease Neg Hx     Irritable bowel syndrome Neg Hx     Liver cancer Neg Hx     Liver disease Neg Hx     Rectal cancer Neg Hx     Stomach cancer Neg Hx     Ulcerative colitis Neg Hx     Bakari's disease Neg Hx        Review of patient's allergies indicates:   Allergen Reactions    Vicodin  [hydrocodone-acetaminophen] Anxiety     Make him angry       Current Outpatient Medications   Medication Sig    B-complex with vitamin C (Z-BEC OR EQUIV) tablet Take 1 tablet by mouth once daily.    blood sugar diagnostic Strp 1 strip by Misc.(Non-Drug; Combo Route) route once daily.    calcium citrate-vitamin D3 315-200 mg (CITRACAL+D) 315-200 mg-unit per tablet Take 1 tablet by mouth 2 (two) times daily.    citalopram (CELEXA) 20 MG tablet TAKE ONE TABLET BY MOUTH ONCE DAILY    cyanocobalamin (VITAMIN B-12) 1000 MCG tablet Take 100 mcg by mouth once daily.    fish oil-omega-3 fatty acids 300-1,000 mg capsule Take by mouth once daily.    lancets Misc 1 each by Misc.(Non-Drug; Combo Route) route once daily.    lisinopril (PRINIVIL,ZESTRIL) 20 MG tablet Take 1 tablet (20 mg total) by mouth once daily.    metFORMIN (GLUCOPHAGE) 1000 MG tablet Take 1 tablet (1,000 mg total) by mouth 2 (two) times daily.    milk thistle 175 mg tablet Take 1,000 mg by mouth once daily.    traMADol (ULTRAM) 50 mg tablet Take 1 tablet (50 mg total) by mouth every 6 (six) hours as needed (for breakthrough pain).    traZODone (DESYREL) 100 MG tablet TAKE ONE TABLET BY MOUTH ONCE DAILY IN THE EVENING    capsaicin 0.1 % Crea Apply 3 mLs topically 3 (three) times daily as needed.    clonazepam (KLONOPIN) 1 MG tablet Take 1 mg by mouth 2 (two) times daily as needed.      diclofenac sodium (VOLTAREN) 1 % Gel Apply 2 g topically once daily.    docusate sodium (COLACE) 100 MG capsule Take 1 capsule (100 mg total) by mouth 2 (two) times daily as needed.    gabapentin (NEURONTIN) 800 MG tablet Take 1 tablet (800 mg total) by mouth 3 (three) times daily.    ondansetron (ZOFRAN) 4 MG tablet Take 2 tablets (8 mg total) by mouth every 8 (eight) hours as needed.    oxyCODONE-acetaminophen (PERCOCET) 5-325 mg per tablet Take 1 tablet by mouth every 8 (eight) hours as needed for Pain.     No current facility-administered medications for  "this visit.        REVIEW OF SYSTEMS:    GENERAL:  No weight loss, malaise or fevers.  HEENT:  Negative for frequent or significant headaches.  NECK:  Negative for lumps, goiter, pain and significant neck swelling.  RESPIRATORY:  Negative for cough, wheezing or shortness of breath.  CARDIOVASCULAR:  Negative for chest pain, leg swelling or palpitations.  GI:  Negative for abdominal discomfort, blood in stools or black stools or change in bowel habits.  MUSCULOSKELETAL:  See HPI.  SKIN:  Negative for lesions, rash, and itching.  PSYCH:  +ve  for sleep disturbance, mood disorder and recent psychosocial stressors.  HEMATOLOGY/LYMPHOLOGY:  Negative for prolonged bleeding, bruising easily or swollen nodes.  NEURO:   No history of headaches, syncope, paralysis, seizures or tremors.  All other reviewed and negative other than HPI.    OBJECTIVE:    Resp 18   Ht 6' 1" (1.854 m)   Wt 81.6 kg (180 lb)   BMI 23.75 kg/m²     PHYSICAL EXAMINATION:    General appearance: Well appearing, in no acute distress, alert and oriented x3.  Psych:  Mood and affect appropriate.  Skin: Skin color, texture, turgor normal, no rashes or lesions, in both upper and lower body.  Head/face:  Normocephalic, atraumatic. No palpable lymph nodes.  Neck: No pain to palpation over the cervical paraspinous muscles. Spurling Negative. No pain with neck flexion, extension, or lateral flexion.   Cor: RRR  Pulm: CTA  GI:  Soft and non-tender.  Back: Straight leg raising in the sitting and supine positions is negative to radicular pain. Moderate pain to palpation over the spine or costovertebral angles. Normal range of motion without pain reproduction. Positive axial loading test bilateral.  Positive FABERE,Ganselin and Yeoman's test on the both side.negative FADIR  Extremities: positive for left knee limited ROM with tenderness on palpation and crepitus on movement, negative ant and post drawer sign   No deformities, edema, or skin discoloration. Good " capillary refill.  Musculoskeletal: Shoulder, hip, sacroiliac and knee provocative maneuvers are negative. Bilateral upper and lower extremity strength is normal and symmetric.  No atrophy or tone abnormalities are noted.  Neuro: Bilateral upper and lower extremity coordination and muscle stretch reflexes are physiologic and symmetric.  Plantar response are downgoing. No loss of sensation is noted.  Gait:  Antalgic    ASSESSMENT: 64 y.o. year old male with left knee and low back pain, consistent with      1. Primary osteoarthritis of left knee     2. Chronic pain of left knee     3. Spondylosis without myelopathy     4. Osteoarthritis of spine, unspecified spinal osteoarthritis complication status, unspecified spinal region           PLAN:     - I have stressed the importance of physical activity and a home exercise plan to help with pain and improve health.  - Patient can continue with medications for now since they are providing benefits, using them appropriately, and without side effects.  - Schedule for a Left knee genicular nerve radiofrequency ablation to help with his pain and progress with a home exercise plan.  - RTC 4 weeks  - Counseled patient regarding the importance of activity modification, constant sleeping habits and physical therapy.    The above plan and management options were discussed at length with patient. Patient is in agreement with the above and verbalized understanding. It will be communicated with the referring physician via electronic record, fax, or mail.    Robert Taylor MD      06/27/2019

## 2019-06-27 NOTE — LETTER
July 5, 2019      Jay Jay Valdez MD  [address]  [facility] [address]           [facility] [address]  [address]  [place] [address]  Phone: [phone]  Fax: [phone]          Patient: Andrea King   MR Number: [id number]   YOB: 1955   Date of Visit: 6/27/2019       Dear Dr. Jay Jay Valdez:    Thank you for referring Andrea King to me for evaluation. Attached you will find relevant portions of my assessment and plan of care.    If you have questions, please do not hesitate to call me. I look forward to following Andrea King along with you.    Sincerely,    Robert Taylor MD    Enclosure  CC:  No Recipients    If you would like to receive this communication electronically, please contact [email or web address] or [phone] to request more information on [employer] Link access.    For providers and/or their staff who would like to refer a patient to [facility], please contact us through our one-stop-shop provider referral line, [facility], at [phone].    If you feel you have received this communication in error or would no longer like to receive these types of communications, please e-mail [email or web address]

## 2019-07-05 PROBLEM — M25.569 CHRONIC KNEE PAIN: Status: RESOLVED | Noted: 2019-02-06 | Resolved: 2019-07-05

## 2019-07-05 PROBLEM — M43.00 SPONDYLOLYSIS: Status: RESOLVED | Noted: 2019-06-24 | Resolved: 2019-07-05

## 2019-07-05 PROBLEM — G89.29 CHRONIC KNEE PAIN: Status: RESOLVED | Noted: 2019-02-06 | Resolved: 2019-07-05

## 2019-07-05 PROBLEM — M54.42 CHRONIC BILATERAL LOW BACK PAIN WITH BILATERAL SCIATICA: Status: RESOLVED | Noted: 2017-07-07 | Resolved: 2019-07-05

## 2019-07-05 PROBLEM — M54.16 LUMBAR RADICULAR PAIN: Status: RESOLVED | Noted: 2017-07-07 | Resolved: 2019-07-05

## 2019-07-05 PROBLEM — M47.819 SPONDYLOSIS WITHOUT MYELOPATHY: Status: ACTIVE | Noted: 2019-07-05

## 2019-07-05 PROBLEM — M47.9 OSTEOARTHRITIS OF SPINE: Status: ACTIVE | Noted: 2019-07-05

## 2019-07-05 PROBLEM — G89.29 CHRONIC BILATERAL LOW BACK PAIN WITH BILATERAL SCIATICA: Status: RESOLVED | Noted: 2017-07-07 | Resolved: 2019-07-05

## 2019-07-05 PROBLEM — M54.41 CHRONIC BILATERAL LOW BACK PAIN WITH BILATERAL SCIATICA: Status: RESOLVED | Noted: 2017-07-07 | Resolved: 2019-07-05

## 2019-07-15 ENCOUNTER — HOSPITAL ENCOUNTER (OUTPATIENT)
Facility: OTHER | Age: 64
Discharge: HOME | End: 2019-07-15
Attending: ANESTHESIOLOGY | Admitting: ANESTHESIOLOGY
Payer: MEDICARE

## 2019-07-15 VITALS
OXYGEN SATURATION: 97 % | BODY MASS INDEX: 22.75 KG/M2 | HEART RATE: 57 BPM | SYSTOLIC BLOOD PRESSURE: 122 MMHG | RESPIRATION RATE: 18 BRPM | TEMPERATURE: 99 F | HEIGHT: 72 IN | WEIGHT: 168 LBS | DIASTOLIC BLOOD PRESSURE: 62 MMHG

## 2019-07-15 DIAGNOSIS — G89.29 CHRONIC KNEE PAIN, UNSPECIFIED LATERALITY: Primary | ICD-10-CM

## 2019-07-15 DIAGNOSIS — G89.29 CHRONIC PAIN: ICD-10-CM

## 2019-07-15 DIAGNOSIS — M25.569 CHRONIC KNEE PAIN, UNSPECIFIED LATERALITY: Primary | ICD-10-CM

## 2019-07-15 DIAGNOSIS — M17.12 PRIMARY OSTEOARTHRITIS OF LEFT KNEE: ICD-10-CM

## 2019-07-15 DIAGNOSIS — M17.9 OSTEOARTHRITIS OF KNEE, UNSPECIFIED LATERALITY, UNSPECIFIED OSTEOARTHRITIS TYPE: ICD-10-CM

## 2019-07-15 LAB — POCT GLUCOSE: 94 MG/DL (ref 70–110)

## 2019-07-15 PROCEDURE — 64640 INJECTION TREATMENT OF NERVE: CPT | Mod: LT,,, | Performed by: ANESTHESIOLOGY

## 2019-07-15 PROCEDURE — A4649 SURGICAL SUPPLIES: HCPCS | Performed by: ANESTHESIOLOGY

## 2019-07-15 PROCEDURE — 99152 PR MOD CONSCIOUS SEDATION, SAME PHYS, 5+ YRS, FIRST 15 MIN: ICD-10-PCS | Mod: ,,, | Performed by: ANESTHESIOLOGY

## 2019-07-15 PROCEDURE — 99152 MOD SED SAME PHYS/QHP 5/>YRS: CPT | Mod: ,,, | Performed by: ANESTHESIOLOGY

## 2019-07-15 PROCEDURE — 64640 INJECTION TREATMENT OF NERVE: CPT | Performed by: ANESTHESIOLOGY

## 2019-07-15 PROCEDURE — 25000003 PHARM REV CODE 250: Performed by: ANESTHESIOLOGY

## 2019-07-15 PROCEDURE — 25000003 PHARM REV CODE 250: Performed by: STUDENT IN AN ORGANIZED HEALTH CARE EDUCATION/TRAINING PROGRAM

## 2019-07-15 PROCEDURE — 63600175 PHARM REV CODE 636 W HCPCS: Performed by: ANESTHESIOLOGY

## 2019-07-15 PROCEDURE — 64640 PR DESTRUCT BY NEURO AGENT; OTHER PERIPH NERVE: ICD-10-PCS | Mod: LT,,, | Performed by: ANESTHESIOLOGY

## 2019-07-15 RX ORDER — LIDOCAINE HYDROCHLORIDE 10 MG/ML
INJECTION INFILTRATION; PERINEURAL
Status: DISCONTINUED | OUTPATIENT
Start: 2019-07-15 | End: 2019-07-15 | Stop reason: HOSPADM

## 2019-07-15 RX ORDER — BUPIVACAINE HYDROCHLORIDE 2.5 MG/ML
INJECTION, SOLUTION EPIDURAL; INFILTRATION; INTRACAUDAL
Status: DISCONTINUED | OUTPATIENT
Start: 2019-07-15 | End: 2019-07-15 | Stop reason: HOSPADM

## 2019-07-15 RX ORDER — SODIUM CHLORIDE 9 MG/ML
500 INJECTION, SOLUTION INTRAVENOUS CONTINUOUS
Status: DISCONTINUED | OUTPATIENT
Start: 2019-07-15 | End: 2019-07-15 | Stop reason: HOSPADM

## 2019-07-15 RX ORDER — FENTANYL CITRATE 50 UG/ML
INJECTION, SOLUTION INTRAMUSCULAR; INTRAVENOUS
Status: DISCONTINUED | OUTPATIENT
Start: 2019-07-15 | End: 2019-07-15 | Stop reason: HOSPADM

## 2019-07-15 RX ORDER — DEXAMETHASONE SODIUM PHOSPHATE 4 MG/ML
INJECTION, SOLUTION INTRA-ARTICULAR; INTRALESIONAL; INTRAMUSCULAR; INTRAVENOUS; SOFT TISSUE
Status: DISCONTINUED | OUTPATIENT
Start: 2019-07-15 | End: 2019-07-15 | Stop reason: HOSPADM

## 2019-07-15 RX ORDER — MIDAZOLAM HYDROCHLORIDE 1 MG/ML
INJECTION INTRAMUSCULAR; INTRAVENOUS
Status: DISCONTINUED | OUTPATIENT
Start: 2019-07-15 | End: 2019-07-15 | Stop reason: HOSPADM

## 2019-07-15 RX ADMIN — SODIUM CHLORIDE 500 ML: 0.9 INJECTION, SOLUTION INTRAVENOUS at 07:07

## 2019-07-15 NOTE — DISCHARGE INSTRUCTIONS
Thank you for allowing us to care for you today. You may receive a survey about the care we provided. Your feedback is valuable and helps us provide excellent care throughout the community.     Home Care Instructions for Pain Management:    1. DIET:   You may resume your normal diet today.   2. BATHING:   You may shower with luke warm water. No tub baths or anything that will soak injection sites under water for the next 24 hours.  3. DRESSING:   You may remove your bandage today.   4. ACTIVITY LEVEL:   You may resume your normal activities 24 hrs after your procedure. Nothing strenuous today.  5. MEDICATIONS:   You may resume your normal medications today. To restart blood thinners, ask your doctor.  6. DRIVING    If you have received any sedatives by mouth today, you may not drive for 12 hours.    If you have received any sedation through your IV, you may not drive for 24 hrs.   7. SPECIAL INSTRUCTIONS:   No heat to the injection site for 24 hrs including, hot bath or shower, heating pad, moist heat, or hot tubs.    Use ice pack to injection site for any pain or discomfort.  Apply ice packs for 20 minute intervals as needed.    IF you have diabetes, be sure to monitor your blood sugar more closely. IF your injection contained steroids your blood sugar levels may become higher than normal.    If you are still having pain upon discharge:  Your pain may improve over the next 48 hours. The anesthetic (numbing medication) works immediately to 48 hours. IF your injection contained a steroid (anti-inflammatory medication), it takes approximately 3 days to start feeling relief and 7-10 days to see your greatest results from the medication. It is possible you may need subsequent injections. This would be discussed at your follow up appointment with pain management or your referring doctor.      PLEASE CALL YOUR DOCTOR IF:  1. Redness or swelling around the injection site.  2. Fever of 101 degrees or more  3. Drainage  (pus) from the injection site.  4. For any continuous bleeding (some dried blood over the incision is normal.)    FOR EMERGENCIES:   If any unusual problems or difficulties occur during clinic hours, call [phone] or [phone]. Adult Procedural Sedation Instructions    Recovery After Procedural Sedation (Adult)  You have been given medicine by vein to make you sleep during your surgery. This may have included both a pain medicine and sleeping medicine. Most of the effects have worn off. But you may still have some drowsiness for the next 6 to 8 hours.  Home care  Follow these guidelines when you get home:  · For the next 8 hours, you should be watched by a responsible adult. This person should make sure your condition is not getting worse.  · Don't drink any alcohol for the next 24 hours.  · Don't drive, operate dangerous machinery, or make important business or personal decisions during the next 24 hours.  Note: Your healthcare provider may tell you not to take any medicine by mouth for pain or sleep in the next 4 hours. These medicines may react with the medicines you were given in the hospital. This could cause a much stronger response than usual.  Follow-up care  Follow up with your healthcare provider if you are not alert and back to your usual level of activity within 12 hours.  When to seek medical advice  Call your healthcare provider right away if any of these occur:  · Drowsiness gets worse  · Weakness or dizziness gets worse  · Repeated vomiting  · You can't be awakened   Date Last Reviewed: 10/18/2016  © 0256-1174 [employer]. [address] [place] [address], [place] [address]. All rights reserved. This information is not intended as a substitute for professional medical care. Always follow your healthcare professional's instructions.

## 2019-07-15 NOTE — OP NOTE
Patient Name: Andrea King  MRN: [id number]    INFORMED CONSENT: The procedure, risks, benefits and options were discussed with patient. There are no contraindications to the procedure. The patient expressed understanding and agreed to proceed. The personnel performing the procedure was discussed. I verify that I personally obtained Andrea's consent prior to the start of the procedure and the signed consent can be found on the patient's chart.    Procedure Date: 07/15/2019    Anesthesia: Topical    Pre Procedure diagnosis: Chronic pain of left knee [M25.562, G89.29]  1. Chronic knee pain, unspecified laterality    2. Osteoarthritis of knee, unspecified laterality, unspecified osteoarthritis type    3. Primary osteoarthritis of left knee    4. Chronic pain      Post-Procedure diagnosis: SAME      Moderate Sedation: Yes - Fentanyl 100 mcg and Midazolam 2 mg      PROCEDURE: left GENICULAR NERVE RADIOFREQUENCY ABLATION    DESCRIPTION OF PROCEDURE: The patient was brought to the fluoroscopy suite. IV access was obtained prior to the procedure. The patient was positioned supine on the fluoroscopy table. Continuous hemodynamic monitoring was initiated including blood pressure, EKG, and pulse oximetry. The area of the around the left knee joint was prepped with chlorhexidine  and draped into a sterile field. Skin anesthesia was achieved using Lidocaine 1% over the respective injection sites. A 17 gauge 50 mm (4mm active tip) tip RF needle  was slowly inserted and advanced to the junction of the lateral femoral and the epicondyle while contacting periosteum to block the superior lateral genicular nerve using the AP and lateral fluoroscopic imaging. Same process was repeated at the junction of the medial femoral shaft and the epicondyle to block the superior medial genicular nerve. The same process was repeated again using a 3rd needle which was advanced at the junction of the medial tibial plateau and the epicondyle to  block the inferior medial genicular nerve. Using AP fluoroscopy the position of all 3 needles was confirmed. Afterwards lateral fluoroscopic images were obtained and the needles were adjusted to lay in the middle of the diaphysis.Negative aspiration for blood. Motor stimulation at 2Hz up to 1.5V did not cause any radicular symptoms at any level. Each level was anesthetized with 1.5 cc of lidocaine 1%. Radiofrequency lesioning was performed for 150 seconds at 60 degrees . A combination of 3 cc of Bupivacaine 0.25% and 40 mg of kenalog was injected at all needle location to block all targeted nerves.. There was no pain on injection. The needle was removed and bleeding was nil. A sterile dressing was applied. No specimens collected. PATIENT was taken to the Post-block Recovery Area for further observation.       Blood Loss: Nill  Specimen: None        Robert Taylor MD

## 2019-07-15 NOTE — DISCHARGE SUMMARY
Discharge Note  Short Stay      SUMMARY     Admit Date: 7/15/2019    Attending Physician: Robert Taylor      Discharge Physician: Robert Taylor      Discharge Date: 7/15/2019 8:56 AM    Procedure(s) (LRB):  RADIOFREQUENCY ABLATION COOLED LEFT GENICULAR (Left)    Final Diagnosis: Chronic pain of left knee [M25.562, G89.29]    Disposition: Home or self care    Patient Instructions:   Current Discharge Medication List      CONTINUE these medications which have NOT CHANGED    Details   B-complex with vitamin C (Z-BEC OR EQUIV) tablet Take 1 tablet by mouth once daily.      blood sugar diagnostic Strp 1 strip by Misc.(Non-Drug; Combo Route) route once daily.  Qty: 50 each, Refills: 11      calcium citrate-vitamin D3 315-200 mg (CITRACAL+D) 315-200 mg-unit per tablet Take 1 tablet by mouth 2 (two) times daily.      capsaicin 0.1 % Crea Apply 3 mLs topically 3 (three) times daily as needed.  Qty: 56.6 g, Refills: 0      citalopram (CELEXA) 20 MG tablet TAKE ONE TABLET BY MOUTH ONCE DAILY  Qty: 30 tablet, Refills: 11    Comments: Please consider 90 day supplies to promote better adherence      clonazepam (KLONOPIN) 1 MG tablet Take 1 mg by mouth 2 (two) times daily as needed.        cyanocobalamin (VITAMIN B-12) 1000 MCG tablet Take 100 mcg by mouth once daily.      diclofenac sodium (VOLTAREN) 1 % Gel Apply 2 g topically once daily.  Qty: 1 Tube, Refills: 2      docusate sodium (COLACE) 100 MG capsule Take 1 capsule (100 mg total) by mouth 2 (two) times daily as needed.  Qty: 60 capsule, Refills: 0      fish oil-omega-3 fatty acids 300-1,000 mg capsule Take by mouth once daily.      gabapentin (NEURONTIN) 800 MG tablet Take 1 tablet (800 mg total) by mouth 3 (three) times daily.  Qty: 90 tablet, Refills: 11      lancets Misc 1 each by Misc.(Non-Drug; Combo Route) route once daily.  Qty: 50 each, Refills: 11      lisinopril (PRINIVIL,ZESTRIL) 20 MG tablet Take 1 tablet (20 mg total) by mouth once daily.  Qty: 30  tablet, Refills: 11    Comments: Please consider 90 day supplies to promote better adherence      metFORMIN (GLUCOPHAGE) 1000 MG tablet Take 1 tablet (1,000 mg total) by mouth 2 (two) times daily.  Qty: 60 tablet, Refills: 11    Comments: Please consider 90 day supplies to promote better adherence      milk thistle 175 mg tablet Take 1,000 mg by mouth once daily.      ondansetron (ZOFRAN) 4 MG tablet Take 2 tablets (8 mg total) by mouth every 8 (eight) hours as needed.  Qty: 20 tablet, Refills: 0      oxyCODONE-acetaminophen (PERCOCET) 5-325 mg per tablet Take 1 tablet by mouth every 8 (eight) hours as needed for Pain.  Qty: 60 tablet, Refills: 0      traMADol (ULTRAM) 50 mg tablet Take 1 tablet (50 mg total) by mouth every 6 (six) hours as needed (for breakthrough pain).  Qty: 60 tablet, Refills: 0    Associated Diagnoses: Chronic bilateral low back pain with bilateral sciatica      traZODone (DESYREL) 100 MG tablet TAKE ONE TABLET BY MOUTH ONCE DAILY IN THE EVENING  Qty: 30 tablet, Refills: 11    Comments: Please consider 90 day supplies to promote better adherence                 Discharge Diagnosis: Chronic pain of left knee [M25.562, G89.29]  Condition on Discharge: Stable with no complications to procedure   Diet on Discharge: Same as before.  Activity: as per instruction sheet.  Discharge to: Home with a responsible adult.  Follow up: 2-4 weeks    Please call my office or pager at [phone] if experienced any weakness or loss of sensation, fever > 101.5, pain uncontrolled with oral medications, persistent nausea/vomiting/or diarrhea, redness or drainage from the incisions, or any other worrisome concerns. If physician on call was not reached or could not communicate with our office for any reason please go to the nearest emergency department

## 2019-07-15 NOTE — DISCHARGE SUMMARY
Discharge Note  Short Stay      SUMMARY     Admit Date: 7/15/2019    Attending Physician: Kyle Gore      Discharge Physician: Kyle Gore      Discharge Date: 7/15/2019 8:55 AM    Procedure(s) (LRB):  RADIOFREQUENCY ABLATION COOLED LEFT GENICULAR (Left)    Final Diagnosis: Chronic pain of left knee [M25.562, G89.29]    Disposition: Home or self care    Patient Instructions:   Current Discharge Medication List      CONTINUE these medications which have NOT CHANGED    Details   B-complex with vitamin C (Z-BEC OR EQUIV) tablet Take 1 tablet by mouth once daily.      blood sugar diagnostic Strp 1 strip by Misc.(Non-Drug; Combo Route) route once daily.  Qty: 50 each, Refills: 11      calcium citrate-vitamin D3 315-200 mg (CITRACAL+D) 315-200 mg-unit per tablet Take 1 tablet by mouth 2 (two) times daily.      capsaicin 0.1 % Crea Apply 3 mLs topically 3 (three) times daily as needed.  Qty: 56.6 g, Refills: 0      citalopram (CELEXA) 20 MG tablet TAKE ONE TABLET BY MOUTH ONCE DAILY  Qty: 30 tablet, Refills: 11    Comments: Please consider 90 day supplies to promote better adherence      clonazepam (KLONOPIN) 1 MG tablet Take 1 mg by mouth 2 (two) times daily as needed.        cyanocobalamin (VITAMIN B-12) 1000 MCG tablet Take 100 mcg by mouth once daily.      diclofenac sodium (VOLTAREN) 1 % Gel Apply 2 g topically once daily.  Qty: 1 Tube, Refills: 2      docusate sodium (COLACE) 100 MG capsule Take 1 capsule (100 mg total) by mouth 2 (two) times daily as needed.  Qty: 60 capsule, Refills: 0      fish oil-omega-3 fatty acids 300-1,000 mg capsule Take by mouth once daily.      gabapentin (NEURONTIN) 800 MG tablet Take 1 tablet (800 mg total) by mouth 3 (three) times daily.  Qty: 90 tablet, Refills: 11      lancets Misc 1 each by Misc.(Non-Drug; Combo Route) route once daily.  Qty: 50 each, Refills: 11      lisinopril (PRINIVIL,ZESTRIL) 20 MG tablet Take 1 tablet (20 mg total) by mouth once daily.  Qty: 30  tablet, Refills: 11    Comments: Please consider 90 day supplies to promote better adherence      metFORMIN (GLUCOPHAGE) 1000 MG tablet Take 1 tablet (1,000 mg total) by mouth 2 (two) times daily.  Qty: 60 tablet, Refills: 11    Comments: Please consider 90 day supplies to promote better adherence      milk thistle 175 mg tablet Take 1,000 mg by mouth once daily.      ondansetron (ZOFRAN) 4 MG tablet Take 2 tablets (8 mg total) by mouth every 8 (eight) hours as needed.  Qty: 20 tablet, Refills: 0      oxyCODONE-acetaminophen (PERCOCET) 5-325 mg per tablet Take 1 tablet by mouth every 8 (eight) hours as needed for Pain.  Qty: 60 tablet, Refills: 0      traMADol (ULTRAM) 50 mg tablet Take 1 tablet (50 mg total) by mouth every 6 (six) hours as needed (for breakthrough pain).  Qty: 60 tablet, Refills: 0    Associated Diagnoses: Chronic bilateral low back pain with bilateral sciatica      traZODone (DESYREL) 100 MG tablet TAKE ONE TABLET BY MOUTH ONCE DAILY IN THE EVENING  Qty: 30 tablet, Refills: 11    Comments: Please consider 90 day supplies to promote better adherence                 Discharge Diagnosis: Chronic pain of left knee [M25.562, G89.29]  Condition on Discharge: Stable with no complications to procedure   Diet on Discharge: Same as before.  Activity: as per instruction sheet.  Discharge to: Home with a responsible adult.  Follow up: 2-4 weeks       Please call my office or pager at [phone] if experienced any weakness or loss of sensation, fever > 101.5, pain uncontrolled with oral medications, persistent nausea/vomiting/or diarrhea, redness or drainage from the incisions, or any other worrisome concerns. If physician on call was not reached or could not communicate with our office for any reason please go to the nearest emergency department

## 2019-07-15 NOTE — OP NOTE
Patient Name: Andrea King  MRN: [id number]    INFORMED CONSENT: The procedure, risks, benefits and options were discussed with patient. There are no contraindications to the procedure. The patient expressed understanding and agreed to proceed. The personnel performing the procedure was discussed. I verify that I personally obtained Andrea's consent prior to the start of the procedure and the signed consent can be found on the patient's chart.    Procedure Date: 07/15/2019    Anesthesia: Topical    Pre Procedure diagnosis: Chronic pain of left knee [M25.562, G89.29]  1. Chronic knee pain, unspecified laterality    2. Osteoarthritis of knee, unspecified laterality, unspecified osteoarthritis type    3. Primary osteoarthritis of left knee    4. Chronic pain      Post-Procedure diagnosis: SAME      Moderate Sedation: Yes - Fentanyl 100 mcg and Midazolam 2 mg      PROCEDURE: Left GENICULAR NERVE RADIOFREQUENCY ABLATION    DESCRIPTION OF PROCEDURE: The patient was brought to the fluoroscopy suite. IV access was obtained prior to the procedure. The patient was positioned supine on the fluoroscopy table. Continuous hemodynamic monitoring was initiated including blood pressure, EKG, and pulse oximetry. The area of the around the Left knee joint was prepped with chlorhexidine  and draped into a sterile field. Skin anesthesia was achieved using Lidocaine 1% over the respective injection sites. A 17 gauge 50 mm (4mm active tip) tip RF needle  was slowly inserted and advanced to the junction of the lateral femoral and the epicondyle while contacting periosteum to block the superior lateral genicular nerve using the AP and lateral fluoroscopic imaging. Same process was repeated at the junction of the medial femoral shaft and the epicondyle to block the superior medial genicular nerve. The same process was repeated again using a 3rd needle which was advanced at the junction of the medial tibial plateau and the epicondyle to  block the inferior medial genicular nerve. Using AP fluoroscopy the position of all 3 needles was confirmed. Afterwards lateral fluoroscopic images were obtained and the needles were adjusted to lay in the middle of the diaphysis.Negative aspiration for blood. Motor stimulation at 2Hz up to 1.5V did not cause any radicular symptoms at any level. Each level was anesthetized with 1.5 cc of lidocaine 1%. Radiofrequency lesioning was performed for 150 seconds at 60 degrees . A combination of 3 cc of Bupivacaine 0.25% and 40 mg of kenalog was injected at all needle location to block all targeted nerves.. There was no pain on injection. The needle was removed and bleeding was nil. A sterile dressing was applied. No specimens collected. PATIENT was taken to the Post-block Recovery Area for further observation.       Blood Loss: Nill  Specimen: None        Kyle Gore DO   [employer] [facility] PGY-2

## 2019-10-21 PROBLEM — Z86.010 HISTORY OF COLON POLYPS: Status: ACTIVE | Noted: 2019-10-21

## 2019-10-24 PROBLEM — F33.41 RECURRENT MAJOR DEPRESSIVE DISORDER, IN PARTIAL REMISSION: Status: ACTIVE | Noted: 2019-10-24

## 2019-11-20 PROBLEM — Z86.010 HX OF COLONIC POLYPS: Status: ACTIVE | Noted: 2019-11-20

## 2020-04-06 ENCOUNTER — TELEPHONE (OUTPATIENT)
Dept: ADMINISTRATIVE | Facility: HOSPITAL | Age: 65
End: 2020-04-06

## 2020-05-15 PROBLEM — E86.0 DEHYDRATION: Status: ACTIVE | Noted: 2020-05-15

## 2020-08-11 ENCOUNTER — OFFICE VISIT (OUTPATIENT)
Dept: URGENT CARE | Facility: CLINIC | Age: 65
End: 2020-08-11
Payer: MEDICARE

## 2020-08-11 VITALS
RESPIRATION RATE: 24 BRPM | OXYGEN SATURATION: 98 % | DIASTOLIC BLOOD PRESSURE: 82 MMHG | WEIGHT: 180 LBS | HEIGHT: 73 IN | BODY MASS INDEX: 23.86 KG/M2 | SYSTOLIC BLOOD PRESSURE: 147 MMHG | HEART RATE: 90 BPM | TEMPERATURE: 98 F
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
## 2020-08-11 DIAGNOSIS — M54.9 UPPER BACK PAIN: ICD-10-CM

## 2020-08-11 DIAGNOSIS — R07.81 RIB PAIN ON LEFT SIDE: ICD-10-CM

## 2020-08-11 PROCEDURE — 99214 PR OFFICE/OUTPT VISIT, EST, LEVL IV, 30-39 MIN: ICD-10-PCS | Mod: S$GLB,,, | Performed by: NURSE PRACTITIONER

## 2020-08-11 PROCEDURE — 99214 OFFICE O/P EST MOD 30 MIN: CPT | Mod: S$GLB,,, | Performed by: NURSE PRACTITIONER

## 2020-08-11 NOTE — PROGRESS NOTES
"Subjective:       Patient ID: Andrea King is a 65 y.o. male.    Vitals:  height is 6' 1" (1.854 m) and weight is 81.6 kg (180 lb). His temperature is 97.8 °F (36.6 °C). His blood pressure is 147/82 (abnormal) and his pulse is 90. His respiration is 24 (abnormal) and oxygen saturation is 98%.     Chief Complaint: Rib Injury    Pt reports he was going to climb off a storage container and blacked out causing him to fall 13ft to the ground. Pt reports incident happened 2 days ago and he is experiencing pain with breathing, rib pain, and upper back pain. Pt also reports the fall caused him to cut his right inner thigh. Pt unsure if he hit his head. Pt reports he used popscicles  as an ice pack to help with the rib pain and thought he would wait it out to see if they symptoms would resolve.     Fall  The accident occurred 2 days ago. Fall occurred: from a container high in the air. He fell from a height of 11 to 15 ft. The volume of blood lost was minimal. The point of impact was the head, left shoulder and neck. The pain is present in the left shoulder, left upper leg and neck (left rib ). The pain is at a severity of 10/10. The pain is severe. The symptoms are aggravated by standing, movement and sitting. Associated symptoms include a loss of consciousness. Pertinent negatives include no abdominal pain or hematuria. He has tried acetaminophen for the symptoms. The treatment provided no relief.       Constitution: Negative for fatigue.   HENT: Negative for facial swelling and facial trauma.    Neck: Negative for neck stiffness.   Cardiovascular: Positive for chest trauma.   Eyes: Negative for eye trauma, double vision and blurred vision.   Gastrointestinal: Negative for abdominal trauma, abdominal pain and rectal bleeding.   Genitourinary: Negative for hematuria, genital trauma and pelvic pain.   Musculoskeletal: Positive for pain, trauma and pain with walking. Negative for joint swelling and abnormal ROM of joint. "   Skin: Positive for erythema. Negative for color change, wound, abrasion and laceration.   Neurological: Positive for passing out and loss of consciousness. Negative for dizziness, history of vertigo, light-headedness and coordination disturbances.   Hematologic/Lymphatic: Negative for history of bleeding disorder.       Objective:      Physical Exam   Constitutional: He is oriented to person, place, and time. He appears well-developed. He is cooperative.  Non-toxic appearance. He does not appear ill. No distress.   HENT:   Head: Normocephalic and atraumatic. Head is without abrasion, without contusion and without laceration.   Ears:   Right Ear: Hearing, tympanic membrane, external ear and ear canal normal. No hemotympanum.   Left Ear: Hearing, tympanic membrane, external ear and ear canal normal. No hemotympanum.   Nose: Nose normal. No mucosal edema, rhinorrhea or nasal deformity. No epistaxis. Right sinus exhibits no maxillary sinus tenderness and no frontal sinus tenderness. Left sinus exhibits no maxillary sinus tenderness and no frontal sinus tenderness.   Mouth/Throat: Uvula is midline, oropharynx is clear and moist and mucous membranes are normal. No trismus in the jaw. Normal dentition. No uvula swelling. No posterior oropharyngeal erythema.   Eyes: Pupils are equal, round, and reactive to light. Conjunctivae, EOM and lids are normal. Right eye exhibits no discharge. Left eye exhibits no discharge. No scleral icterus.   Neck: Trachea normal, normal range of motion, full passive range of motion without pain and phonation normal. Neck supple. No spinous process tenderness and no muscular tenderness present. No neck rigidity. No tracheal deviation present.   Cardiovascular: Normal rate, regular rhythm, normal heart sounds and normal pulses.   Pulmonary/Chest: Effort normal and breath sounds normal. No accessory muscle usage. No respiratory distress. He has no decreased breath sounds. He has no wheezes. He  has no rales. He exhibits tenderness (left ).   Abdominal: Soft. Normal appearance and bowel sounds are normal. He exhibits no distension, no pulsatile midline mass and no mass. There is no abdominal tenderness. There is no left CVA tenderness and no right CVA tenderness.   Musculoskeletal: Normal range of motion.         General: Tenderness present. No swelling or deformity.      Right lower leg: No edema.      Left lower leg: No edema.        Legs:    Lymphadenopathy:     He has no cervical adenopathy.   Neurological: He is alert and oriented to person, place, and time. He has normal strength. No cranial nerve deficit or sensory deficit. He exhibits normal muscle tone. He displays no seizure activity. Coordination and gait (pt walking slowly holding the left side) normal. GCS eye subscore is 4. GCS verbal subscore is 5. GCS motor subscore is 6.   Skin: Skin is warm, dry, intact, not diaphoretic and not pale. Capillary refill takes less than 2 seconds. Lesions:  erythemaabrasion, burn, bruising and ecchymosisPsychiatric: His speech is normal and behavior is normal. Judgment and thought content normal.   Nursing note and vitals reviewed.        Assessment:       1. Fall, initial encounter    2. Rib pain on left side    3. Upper back pain        Plan:       Pt advised to go to the nearest ED for further evaluation of symptoms. Pt refused EMS transport reporting his wife will drive him to [facility]. [facility] ED staff nurse notified.    Fall, initial encounter  -     Refer to Emergency Dept.    Rib pain on left side  -     Refer to Emergency Dept.    Upper back pain  -     Refer to Emergency Dept.      Patient Instructions   Go to ED for further evaluation

## 2021-05-04 ENCOUNTER — PATIENT MESSAGE (OUTPATIENT)
Dept: RESEARCH | Facility: HOSPITAL | Age: 66
End: 2021-05-04

## 2021-05-10 ENCOUNTER — PATIENT MESSAGE (OUTPATIENT)
Dept: RESEARCH | Facility: HOSPITAL | Age: 66
End: 2021-05-10

## 2021-07-01 ENCOUNTER — PATIENT MESSAGE (OUTPATIENT)
Dept: ADMINISTRATIVE | Facility: OTHER | Age: 66
End: 2021-07-01

## (undated) DEVICE — BANDAGE ADHESIVE